# Patient Record
Sex: FEMALE | Race: WHITE | Employment: OTHER | URBAN - METROPOLITAN AREA
[De-identification: names, ages, dates, MRNs, and addresses within clinical notes are randomized per-mention and may not be internally consistent; named-entity substitution may affect disease eponyms.]

---

## 2019-11-15 PROBLEM — Z85.3 HISTORY OF BREAST CANCER: Status: ACTIVE | Noted: 2019-11-15

## 2021-01-29 ENCOUNTER — IMMUNIZATION (OUTPATIENT)
Dept: PRIMARY CARE CLINIC | Age: 81
End: 2021-01-29
Payer: MEDICARE

## 2021-01-29 PROCEDURE — 0001A COVID-19, PFIZER VACCINE 30MCG/0.3ML DOSE: CPT | Performed by: NURSE PRACTITIONER

## 2021-01-29 PROCEDURE — 91300 COVID-19, PFIZER VACCINE 30MCG/0.3ML DOSE: CPT | Performed by: NURSE PRACTITIONER

## 2021-02-19 ENCOUNTER — IMMUNIZATION (OUTPATIENT)
Dept: PRIMARY CARE CLINIC | Age: 81
End: 2021-02-19
Payer: MEDICARE

## 2021-02-19 PROCEDURE — 91300 COVID-19, PFIZER VACCINE 30MCG/0.3ML DOSE: CPT | Performed by: PHYSICIAN ASSISTANT

## 2021-02-19 PROCEDURE — 0002A COVID-19, PFIZER VACCINE 30MCG/0.3ML DOSE: CPT | Performed by: PHYSICIAN ASSISTANT

## 2024-04-09 ENCOUNTER — HOSPITAL ENCOUNTER (OUTPATIENT)
Dept: GASTROENTEROLOGY | Facility: HOSPITAL | Age: 84
Setting detail: OUTPATIENT SURGERY
Discharge: HOME | End: 2024-04-09
Payer: MEDICARE

## 2024-04-09 VITALS
OXYGEN SATURATION: 99 % | SYSTOLIC BLOOD PRESSURE: 188 MMHG | RESPIRATION RATE: 18 BRPM | DIASTOLIC BLOOD PRESSURE: 106 MMHG | HEART RATE: 64 BPM

## 2024-04-09 DIAGNOSIS — R13.10 DYSPHAGIA, UNSPECIFIED TYPE: ICD-10-CM

## 2024-04-09 PROCEDURE — 91010 ESOPHAGUS MOTILITY STUDY: CPT

## 2024-04-09 PROCEDURE — 91010 ESOPHAGUS MOTILITY STUDY: CPT | Performed by: INTERNAL MEDICINE

## 2024-04-09 RX ORDER — SUCRALFATE 1 G/1
1 TABLET ORAL
COMMUNITY

## 2024-04-09 RX ORDER — LIDOCAINE HYDROCHLORIDE 20 MG/ML
1 JELLY TOPICAL ONCE
Status: CANCELLED | OUTPATIENT
Start: 2024-04-09

## 2024-04-09 RX ORDER — DOXYCYCLINE 50 MG/1
50 TABLET ORAL DAILY
COMMUNITY
End: 2024-05-22 | Stop reason: ALTCHOICE

## 2024-04-09 RX ORDER — VENLAFAXINE 37.5 MG/1
37.5 TABLET ORAL 2 TIMES DAILY
COMMUNITY

## 2024-04-09 RX ORDER — LOPERAMIDE HCL 2 MG
1 TABLET ORAL 4 TIMES DAILY PRN
COMMUNITY

## 2024-04-09 RX ORDER — CELECOXIB 100 MG/1
100 CAPSULE ORAL 2 TIMES DAILY
COMMUNITY

## 2024-04-09 RX ORDER — PANTOPRAZOLE SODIUM 40 MG/1
40 TABLET, DELAYED RELEASE ORAL 2 TIMES DAILY
COMMUNITY

## 2024-04-09 RX ORDER — GABAPENTIN 300 MG/1
300 CAPSULE ORAL 3 TIMES DAILY
COMMUNITY

## 2024-04-12 PROCEDURE — 91037 ESOPH IMPED FUNCTION TEST: CPT | Performed by: INTERNAL MEDICINE

## 2024-05-09 ENCOUNTER — OFFICE VISIT (OUTPATIENT)
Dept: SURGERY | Facility: CLINIC | Age: 84
End: 2024-05-09
Payer: MEDICARE

## 2024-05-09 VITALS
TEMPERATURE: 97.9 F | BODY MASS INDEX: 19.65 KG/M2 | SYSTOLIC BLOOD PRESSURE: 192 MMHG | WEIGHT: 110.9 LBS | OXYGEN SATURATION: 96 % | HEIGHT: 63 IN | DIASTOLIC BLOOD PRESSURE: 69 MMHG | HEART RATE: 61 BPM

## 2024-05-09 DIAGNOSIS — K22.0 ACHALASIA OF ESOPHAGUS: Primary | ICD-10-CM

## 2024-05-09 PROCEDURE — 99205 OFFICE O/P NEW HI 60 MIN: CPT | Performed by: SURGERY

## 2024-05-09 PROCEDURE — 1036F TOBACCO NON-USER: CPT | Performed by: SURGERY

## 2024-05-09 PROCEDURE — 1159F MED LIST DOCD IN RCRD: CPT | Performed by: SURGERY

## 2024-05-09 PROCEDURE — 1126F AMNT PAIN NOTED NONE PRSNT: CPT | Performed by: SURGERY

## 2024-05-09 ASSESSMENT — ENCOUNTER SYMPTOMS
LOSS OF SENSATION IN FEET: 0
OCCASIONAL FEELINGS OF UNSTEADINESS: 0
DEPRESSION: 0

## 2024-05-09 ASSESSMENT — PAIN SCALES - GENERAL: PAINLEVEL: 0-NO PAIN

## 2024-05-09 NOTE — PROGRESS NOTES
General Surgery Clinic Note  Subjective   Chief Complaint/Reason for Visit: Eval for POEM; type 2 achalasia     HPI:  Bruce Ziegler is a 83 y.o. female with history of L breast DCIS, IBS, RA, osteoporosis  who presents to clinic today for evaluation for POEM for type 2 achalasia demonstrated on manometry on 4/9/24. She had an EGD 3/18/24 which demonstrated Schatzki's ring at GE junction. She has had > 5 EGD dilations in the past however none have provided lasting relief. She reports routinely taking BID PPI without much relief. Has to eat small meals to prevent regurgitation, otherwise regurgitates solid food. Denies F/C/N/abdominal pain.     A 12-point ROS was performed and was unremarkable except as above.    PMH:  Past Medical History:   Diagnosis Date    Allergy status to unspecified drugs, medicaments and biological substances     History of seasonal allergies    Anxiety disorder, unspecified     Anxiety    Dry eye syndrome of unspecified lacrimal gland     Dry eye    Other allergy status, other than to drugs and biological substances     History of environmental allergies    Personal history of malignant neoplasm of breast     History of breast cancer    Personal history of other diseases of the digestive system     History of IBS    Personal history of other diseases of the musculoskeletal system and connective tissue     History of arthritis     PSH:  Past Surgical History:   Procedure Laterality Date    GALLBLADDER SURGERY  09/21/2017    Gallbladder Surgery    TONSILLECTOMY  09/21/2017    Tonsillectomy     Soc Hx:  Social History     Socioeconomic History    Marital status:      Spouse name: Not on file    Number of children: Not on file    Years of education: Not on file    Highest education level: Not on file   Occupational History    Not on file   Tobacco Use    Smoking status: Never    Smokeless tobacco: Never   Substance and Sexual Activity    Alcohol use: Never    Drug use: Never    Sexual  activity: Not on file   Other Topics Concern    Not on file   Social History Narrative    Not on file     Social Determinants of Health     Financial Resource Strain: Not on file   Food Insecurity: Not on file   Transportation Needs: Not on file   Physical Activity: Not on file   Stress: Not on file   Social Connections: Not on file   Intimate Partner Violence: Not on file   Housing Stability: Not on file     Fam Hx:  No family history on file.   Allergies:  No Known Allergies  Current Medications:  Current Outpatient Medications on File Prior to Visit   Medication Sig Dispense Refill    calcium polycarbophiL (Fibercon) 625 mg tablet Take 1 tablet (625 mg) by mouth 2 times a day.      celecoxib (CeleBREX) 100 mg capsule Take 1 capsule (100 mg) by mouth 2 times a day.      gabapentin (Neurontin) 300 mg capsule Take 1 capsule (300 mg) by mouth 3 times a day.      loperamide (Imodium A-D) 2 mg tablet Take 0.5 tablets (1 mg) by mouth 4 times a day as needed for diarrhea (once a day).      pantoprazole (ProtoNix) 40 mg EC tablet Take 1 tablet (40 mg) by mouth 2 times a day. Do not crush, chew, or split.      sucralfate (Carafate) 1 gram tablet Take 1 tablet (1 g) by mouth 2 times a day before meals.      venlafaxine (Effexor) 37.5 mg tablet Take 1 tablet (37.5 mg) by mouth 2 times a day.      doxycycline (Adoxa) 50 mg tablet Take 1 tablet (50 mg) by mouth once daily. Take with a full glass of water and do not lie down for at least 30 minutes after.       No current facility-administered medications on file prior to visit.         Objective   Vitals:  Temp:  [36.6 °C (97.9 °F)] 36.6 °C (97.9 °F)  Heart Rate:  [61] 61  BP: (192)/(69) 192/69    Physical Exam:  GEN: No acute distress. Alert, awake and conversive.  HEENT: Sclera anicteric. Moist mucous membranes.  RESP: Breathing non-labored, equal chest rise. On RA.  CV: Regular rate, normotensive  GI: Abdomen soft, nondistended, nontender.   : Deferred.  MSK: No gross  deformities. Moves all extremities spontaneously.  NEURO: Alert and oriented x3. No focal deficits.  PSYCH: Appropriate mood and affect.  SKIN: No rashes or lesions.    Labs within past 24h:  No results found for this or any previous visit (from the past 24 hour(s)).    Imaging within past 24h:  Esophageal manometry 4/9/24 consistent with type 2 achalasia     No pertinent imaging to review.     ASSESSMENT  Bruce Ziegler is a 83 y.o. female with history of L breast DCIS, IBS, RA, osteoporosis who presents to clinic today for evaluation for POEM for type 2 achalasia demonstrated on manometry.     PLAN:  - Patient is interested in proceeding with POEM  - Discussed details of surgery and associated risks and benefits.  Risks, benefits, alternatives, complications of POEM were explained at length including bleeding, infection, leakage, pneumoperitoneum, nonresolution or recurrence in symptoms, and possible need for subsequent endoscopic or surgical intervention.    Patient's exam, labs, and findings discussed and seen with Dr. Chandler, who agrees with the plan as described above.    Radha Alaniz MD  PGY-1 General Surgery

## 2024-05-14 DIAGNOSIS — K22.0 ACHALASIA: ICD-10-CM

## 2024-05-14 RX ORDER — NYSTATIN 100000 [USP'U]/ML
5 SUSPENSION ORAL 4 TIMES DAILY
Qty: 60 ML | Refills: 0 | Status: SHIPPED | OUTPATIENT
Start: 2024-05-14 | End: 2024-05-17

## 2024-05-14 RX ORDER — FLUCONAZOLE 200 MG/1
200 TABLET ORAL ONCE
Qty: 1 TABLET | Refills: 0 | Status: SHIPPED | OUTPATIENT
Start: 2024-05-14 | End: 2024-05-14

## 2024-05-20 ENCOUNTER — CLINICAL SUPPORT (OUTPATIENT)
Dept: PREADMISSION TESTING | Facility: HOSPITAL | Age: 84
End: 2024-05-20
Payer: MEDICARE

## 2024-05-20 NOTE — CPM/PAT NURSE NOTE
CPM/PAT Nurse Note      Name: Bruce Ziegler (Bruce Ziegler)  /Age: 1940/83 y.o.       Past Medical History:   Diagnosis Date    Anxiety     Breast CA (Multi)     s/p lumpectomy and radiation    Depression     mild    Dry eye syndrome of unspecified lacrimal gland     Dry eye    GERD (gastroesophageal reflux disease)     Irritable bowel syndrome     Osteoporosis     RA (rheumatoid arthritis) (Multi)     Schatzki's ring        Past Surgical History:   Procedure Laterality Date    CHOLECYSTECTOMY      ESOPHAGOGASTRODUODENOSCOPY  2024    OTHER SURGICAL HISTORY      > 5 EGD dilations    TONSILLECTOMY  2017    Tonsillectomy       Patient  has no history on file for sexual activity.    No family history on file.    No Known Allergies    Prior to Admission medications    Medication Sig Start Date End Date Taking? Authorizing Provider   calcium polycarbophiL (Fibercon) 625 mg tablet Take 1 tablet (625 mg) by mouth 2 times a day.    Historical Provider, MD   celecoxib (CeleBREX) 100 mg capsule Take 1 capsule (100 mg) by mouth 2 times a day.    Historical Provider, MD   doxycycline (Adoxa) 50 mg tablet Take 1 tablet (50 mg) by mouth once daily. Take with a full glass of water and do not lie down for at least 30 minutes after.    Historical Provider, MD   fluconazole (Diflucan) 200 mg tablet Take 1 tablet (200 mg) by mouth 1 time for 1 dose. 24  Travon Chandler MD   gabapentin (Neurontin) 300 mg capsule Take 1 capsule (300 mg) by mouth 3 times a day.    Historical Provider, MD   loperamide (Imodium A-D) 2 mg tablet Take 0.5 tablets (1 mg) by mouth 4 times a day as needed for diarrhea (once a day).    Historical Provider, MD   nystatin (Mycostatin) 100,000 unit/mL suspension Take 5 mL (500,000 Units) by mouth 4 times a day for 3 days. 24  Travon Chandler MD   pantoprazole (ProtoNix) 40 mg EC tablet Take 1 tablet (40 mg) by mouth 2 times a day. Do not crush, chew, or split.     Historical Provider, MD   sucralfate (Carafate) 1 gram tablet Take 1 tablet (1 g) by mouth 2 times a day before meals.    Historical Provider, MD   venlafaxine (Effexor) 37.5 mg tablet Take 1 tablet (37.5 mg) by mouth 2 times a day.    Historical Provider, MD PATSY JEWELL     DASI Risk Score    No data to display       Caprini DVT Assessment    No data to display       Modified Frailty Index    No data to display       CHADS2 Stroke Risk  Current as of today        N/A 3 to 100%: High Risk   2 to < 3%: Medium Risk   0 to < 2%: Low Risk     Last Change: N/A          This score determines the patient's risk of having a stroke if the patient has atrial fibrillation.        This score is not applicable to this patient. Components are not calculated.          Revised Cardiac Risk Index    No data to display       Apfel Simplified Score    No data to display       Risk Analysis Index Results This Encounter    No data found in the last 1 encounters.     Scheduled for esophagoscopy with submucosal myotomy on 24 with Dr. Chandler.  PCP: Elijah Ritchie MD   Phone: [phone], Fax: [phone]  -[id number] Sinus Rhythm    General surgery: Travon Chandler MD LOV 24    Rheumatology: Milton Alford DO seen for RA  Phone: [phone], Fax: [phone]     Gastroenterology: Danny Pan MD seen for IBS  Phone: [phone], Fax: [phone]    Nurse Plan of Action:   Requested last office note  Update: 24 Received last office note from gastroenterology    Zara Michael RN  Pre Admission Testing

## 2024-05-22 ENCOUNTER — PRE-ADMISSION TESTING (OUTPATIENT)
Dept: PREADMISSION TESTING | Facility: HOSPITAL | Age: 84
End: 2024-05-22
Payer: MEDICARE

## 2024-05-22 VITALS
OXYGEN SATURATION: 96 % | DIASTOLIC BLOOD PRESSURE: 74 MMHG | HEIGHT: 63 IN | TEMPERATURE: 98.1 F | WEIGHT: 108 LBS | SYSTOLIC BLOOD PRESSURE: 150 MMHG | HEART RATE: 61 BPM | BODY MASS INDEX: 19.14 KG/M2

## 2024-05-22 DIAGNOSIS — Z01.818 PREOP EXAMINATION: Primary | ICD-10-CM

## 2024-05-22 DIAGNOSIS — M06.9 RHEUMATOID ARTHRITIS, INVOLVING UNSPECIFIED SITE, UNSPECIFIED WHETHER RHEUMATOID FACTOR PRESENT (MULTI): ICD-10-CM

## 2024-05-22 DIAGNOSIS — K22.89 OTHER SPECIFIED DISEASE OF ESOPHAGUS: ICD-10-CM

## 2024-05-22 DIAGNOSIS — K22.0 ACHALASIA: ICD-10-CM

## 2024-05-22 LAB
ABO GROUP (TYPE) IN BLOOD: NORMAL
ANION GAP SERPL CALC-SCNC: 13 MMOL/L (ref 10–20)
ANTIBODY SCREEN: NORMAL
APTT PPP: 30 SECONDS (ref 27–38)
BUN SERPL-MCNC: 13 MG/DL (ref 6–23)
CALCIUM SERPL-MCNC: 8.9 MG/DL (ref 8.6–10.6)
CHLORIDE SERPL-SCNC: 104 MMOL/L (ref 98–107)
CO2 SERPL-SCNC: 32 MMOL/L (ref 21–32)
CREAT SERPL-MCNC: 0.71 MG/DL (ref 0.5–1.05)
EGFRCR SERPLBLD CKD-EPI 2021: 86 ML/MIN/1.73M*2
ERYTHROCYTE [DISTWIDTH] IN BLOOD BY AUTOMATED COUNT: 12.8 % (ref 11.5–14.5)
GLUCOSE SERPL-MCNC: 77 MG/DL (ref 74–99)
HCT VFR BLD AUTO: 40 % (ref 36–46)
HGB BLD-MCNC: 13.2 G/DL (ref 12–16)
INR PPP: 1 (ref 0.9–1.1)
MCH RBC QN AUTO: 29.9 PG (ref 26–34)
MCHC RBC AUTO-ENTMCNC: 33 G/DL (ref 32–36)
MCV RBC AUTO: 91 FL (ref 80–100)
NRBC BLD-RTO: 0 /100 WBCS (ref 0–0)
PLATELET # BLD AUTO: 146 X10*3/UL (ref 150–450)
POTASSIUM SERPL-SCNC: 3.8 MMOL/L (ref 3.5–5.3)
PROTHROMBIN TIME: 10.7 SECONDS (ref 9.8–12.8)
RBC # BLD AUTO: 4.42 X10*6/UL (ref 4–5.2)
RH FACTOR (ANTIGEN D): NORMAL
SODIUM SERPL-SCNC: 145 MMOL/L (ref 136–145)
WBC # BLD AUTO: 4.2 X10*3/UL (ref 4.4–11.3)

## 2024-05-22 PROCEDURE — 86901 BLOOD TYPING SEROLOGIC RH(D): CPT

## 2024-05-22 PROCEDURE — 82374 ASSAY BLOOD CARBON DIOXIDE: CPT

## 2024-05-22 PROCEDURE — 85610 PROTHROMBIN TIME: CPT

## 2024-05-22 PROCEDURE — 85027 COMPLETE CBC AUTOMATED: CPT

## 2024-05-22 PROCEDURE — 99204 OFFICE O/P NEW MOD 45 MIN: CPT | Performed by: NURSE PRACTITIONER

## 2024-05-22 PROCEDURE — 36415 COLL VENOUS BLD VENIPUNCTURE: CPT

## 2024-05-22 ASSESSMENT — DUKE ACTIVITY SCORE INDEX (DASI)
CAN YOU DO YARD WORK LIKE RAKING LEAVES, WEEDING OR PUSHING A MOWER: YES
CAN YOU WALK A BLOCK OR TWO ON LEVEL GROUND: YES
CAN YOU RUN A SHORT DISTANCE: NO
DASI METS SCORE: 8
CAN YOU WALK INDOORS, SUCH AS AROUND YOUR HOUSE: YES
CAN YOU DO HEAVY WORK AROUND THE HOUSE LIKE SCRUBBING FLOORS OR LIFTING AND MOVING HEAVY FURNITURE: YES
TOTAL_SCORE: 42.7
CAN YOU PARTICIPATE IN MODERATE RECREATIONAL ACTIVITIES LIKE GOLF, BOWLING, DANCING, DOUBLES TENNIS OR THROWING A BASEBALL OR FOOTBALL: YES
CAN YOU TAKE CARE OF YOURSELF (EAT, DRESS, BATHE, OR USE TOILET): YES
CAN YOU DO MODERATE WORK AROUND THE HOUSE LIKE VACUUMING, SWEEPING FLOORS OR CARRYING GROCERIES: YES
CAN YOU HAVE SEXUAL RELATIONS: YES
CAN YOU DO LIGHT WORK AROUND THE HOUSE LIKE DUSTING OR WASHING DISHES: YES
CAN YOU CLIMB A FLIGHT OF STAIRS OR WALK UP A HILL: YES
CAN YOU PARTICIPATE IN STRENOUS SPORTS LIKE SWIMMING, SINGLES TENNIS, FOOTBALL, BASKETBALL, OR SKIING: NO

## 2024-05-22 ASSESSMENT — ENCOUNTER SYMPTOMS
ENDOCRINE NEGATIVE: 1
CONSTITUTIONAL NEGATIVE: 1
CHILLS: 0
FEVER: 0
CARDIOVASCULAR NEGATIVE: 1
EYES NEGATIVE: 1
NECK NEGATIVE: 1
GASTROINTESTINAL NEGATIVE: 1
TROUBLE SWALLOWING: 1
NEUROLOGICAL NEGATIVE: 1
MUSCULOSKELETAL NEGATIVE: 1
RESPIRATORY NEGATIVE: 1

## 2024-05-22 ASSESSMENT — PAIN SCALES - GENERAL: PAINLEVEL_OUTOF10: 0 - NO PAIN

## 2024-05-22 ASSESSMENT — LIFESTYLE VARIABLES: SMOKING_STATUS: NONSMOKER

## 2024-05-22 ASSESSMENT — PAIN - FUNCTIONAL ASSESSMENT: PAIN_FUNCTIONAL_ASSESSMENT: 0-10

## 2024-05-22 NOTE — CPM/PAT H&P
CPM/PAT Evaluation       Name: Bruce Ziegler (Bruce Ziegler)  /Age: 1940/83 y.o.     Visit Type:   In-Person       Chief Complaint: Esophagoscopy with Submucosal Myotomy     HPI Patient is an 83 year old female, accompanied by significant other, scheduled for surgery with Dr. Zhen Chandler on 24 related to Achalasia of cardia     Patient referred by Dr. Chandler for preoperative evaluation of anxiety, depression, GERD, dysphagia, IBS, osteoporosis and Rheumatoid arthritis.     Past Medical History:   Diagnosis Date    Anxiety     Breast CA (Multi)     s/p lumpectomy and radiation    Depression     mild    Dry eye syndrome of unspecified lacrimal gland     Dry eye    Dysphagia     GERD (gastroesophageal reflux disease)     Hiatal hernia     Irritable bowel syndrome     Osteoporosis     Schatzki's ring      Past Surgical History:   Procedure Laterality Date    CHOLECYSTECTOMY      COLONOSCOPY  2023    ESOPHAGOGASTRODUODENOSCOPY  2024    several EGDs    OTHER SURGICAL HISTORY      > 5 EGD dilations    TONSILLECTOMY  2017    Tonsillectomy     Family History   Adopted: Yes   Problem Relation Name Age of Onset    Cancer Other       No Known Allergies    Prior to Admission medications    Medication Sig Start Date End Date Taking? Authorizing Provider   calcium polycarbophiL (Fibercon) 625 mg tablet Take 1 tablet (625 mg) by mouth 2 times a day.    Historical Provider, MD   celecoxib (CeleBREX) 100 mg capsule Take 1 capsule (100 mg) by mouth 2 times a day.    Historical Provider, MD   doxycycline (Adoxa) 50 mg tablet Take 1 tablet (50 mg) by mouth once daily. Take with a full glass of water and do not lie down for at least 30 minutes after.    Historical Provider, MD   gabapentin (Neurontin) 300 mg capsule Take 1 capsule (300 mg) by mouth 3 times a day.    Historical Provider, MD   loperamide (Imodium A-D) 2 mg tablet Take 0.5 tablets (1 mg) by mouth 4 times a day as needed for diarrhea  (once a day).    Historical Provider, MD   nystatin (Mycostatin) 100,000 unit/mL suspension Take 5 mL (500,000 Units) by mouth 4 times a day for 3 days. 5/14/24 5/17/24  Travon Chandler MD   pantoprazole (ProtoNix) 40 mg EC tablet Take 1 tablet (40 mg) by mouth 2 times a day. Do not crush, chew, or split.    Historical Provider, MD   sucralfate (Carafate) 1 gram tablet Take 1 tablet (1 g) by mouth 2 times a day before meals.    Historical Provider, MD   venlafaxine (Effexor) 37.5 mg tablet Take 1 tablet (37.5 mg) by mouth 2 times a day.    Historical Provider, MD GARNER ROS:   Constitutional:   neg     no fever   no chills  Neuro/Psych:   neg    Eyes:   neg     use of corrective lenses  Ears:   Nose:   neg    Mouth:   neg    Throat:   neg     dysphagia  Neck:   neg    Cardio:   neg    Respiratory:   neg    Endocrine:   neg    GI:   neg    :   neg    Musculoskeletal:   neg    Hematologic:   neg     no history of blood transfusion   no blood clots  Skin:  neg      Physical Exam  Vitals reviewed.   Constitutional:       Appearance: Normal appearance. She is normal weight.   HENT:      Head: Normocephalic and atraumatic.      Nose: Nose normal.      Mouth/Throat:      Mouth: Mucous membranes are moist.      Pharynx: Oropharynx is clear.   Eyes:      Extraocular Movements: Extraocular movements intact.      Pupils: Pupils are equal, round, and reactive to light.   Cardiovascular:      Rate and Rhythm: Normal rate and regular rhythm.      Heart sounds: Normal heart sounds.   Pulmonary:      Effort: Pulmonary effort is normal.      Breath sounds: Normal breath sounds.   Abdominal:      General: Abdomen is flat. Bowel sounds are normal.      Palpations: Abdomen is soft.   Musculoskeletal:         General: Normal range of motion.      Cervical back: Normal range of motion and neck supple.   Skin:     General: Skin is warm and dry.   Neurological:      Mental Status: She is alert and oriented to person, place, and time.  "  Psychiatric:         Mood and Affect: Mood normal.         Behavior: Behavior normal.         Thought Content: Thought content normal.         Judgment: Judgment normal.        PAT AIRWAY:   Airway:     Mallampati::  III    Neck ROM::  Full   Upper and lower partials   partials    Visit Vitals  /74 Comment: manual recheck   Pulse 61   Temp 36.7 °C (98.1 °F) (Temporal)     Vitals:    05/22/24 1313 05/22/24 1335   BP: 159/65 150/74  Comment: manual recheck   Pulse: 61    Temp: 36.7 °C (98.1 °F)    TempSrc: Temporal    SpO2: 96%    Weight: 49 kg (108 lb)    Height: 1.6 m (5' 3\")       DASI Risk Score      Flowsheet Row Most Recent Value   DASI SCORE 42.7   METS Score (Will be calculated only when all the questions are answered) 8          Caprini DVT Assessment      Flowsheet Row Most Recent Value   DVT Score 10   Current Status Major surgery planned, lasting 2-3 hours   History Prior major surgery, Previous malignancy   Age Over 75 years   BMI 30 or less          Modified Frailty Index      Flowsheet Row Most Recent Value   Modified Frailty Index Calculator 0          CHADS2 Stroke Risk  Current as of 9 minutes ago        N/A 3 to 100%: High Risk   2 to < 3%: Medium Risk   0 to < 2%: Low Risk     Last Change: N/A          This score determines the patient's risk of having a stroke if the patient has atrial fibrillation.        This score is not applicable to this patient. Components are not calculated.          Revised Cardiac Risk Index      Flowsheet Row Most Recent Value   Revised Cardiac Risk Calculator 1          Apfel Simplified Score      Flowsheet Row Most Recent Value   Apfel Simplified Score Calculator 3          Risk Analysis Index Results This Encounter    No data found in the last 1 encounters.       Stop Bang Score      Flowsheet Row Most Recent Value   Do you snore loudly? 0   Do you often feel tired or fatigued after your sleep? 0   Has anyone ever observed you stop breathing in your sleep? 0 "   Do you have or are you being treated for high blood pressure? 0   Recent BMI (Calculated) 19.1   Is BMI greater than 35 kg/m2? 0=No   Age older than 50 years old? 1=Yes   Is your neck circumference greater than 17 inches (Male) or 16 inches (Female)? 0   Gender - Male 0=No   STOP-BANG Total Score 1          Assessment and Plan:     Neuro:   No neurologic diagnoses, however, the patient is at an increased risk for post operative delirium secondary to age >/= 65, depression, type and duration of surgery    The patient is at an increased risk for perioperative stroke secondary to increased age, female, general anesthesia, and op time >2.5 hours    Patient given information on brain exercises and delirium prevention.    Anxiety & depression  States anxiety and depression are currently controlled with venlafaxine    HEENT/Airway  No diagnosis or significant findings on chart review or clinical presentation and evaluation.    Cardiovascular  No diagnosis or significant findings on chart review or clinical presentation and evaluation.    RCRI  The patient meets 0-1 RCRI criteria and therefore has a less than 1% risk of major adverse cardiac complications.  METS  The patient's functional capacity is greater than 4 METS.  MACE  30-day risk for MACE of 1 predictor, 6.0% risk for cardiac death, nonfatal myocardial infarction, and nonfatal cardiac arrest  SHARI  0.4% risk for 51st to 90th percentile    Pulmonary   No diagnosis or significant findings on chart review or clinical presentation and evaluation.    STOP BANG Score of 1, which places patient at low risk for having SHERON.  ARISCAT 32, Intermediate, 13.3% risk of in-hospital postoperative pulmonary complications  PRODIGY 19, high of respiratory depression episode.    Patient given information on deep breathing exercises.     Renal  No diagnosis or significant findings on chart review or clinical presentation and evaluation.    Endocrine  No diagnosis or significant  findings on chart review or clinical presentation and evaluation.    Hematology  No diagnosis or significant findings on chart review or clinical presentation and evaluation.    Caprini score 10, high risk of VTE    Transfusion Evaluation  A type and screen was obtained given the likelihood for perioperative transfusion of blood or blood products.    Patient given information on DVT prevention.     GI    GERD & dysphagia  Currently treated with pantoprazole and sucralfate. Has also been given Nystatin previously.     IBS  States is currently controlled with Fibercon, though she does need loperamide at times    Scheduled for surgery. Outside office information found in Media tab.     Achalasia of cardia   States she has been doing better by chewing food better, but still has issues with food sticking and swallowing.    Scheduled for surgery    Eat 10- 3  Apfel: 3 points 61% risk for post operative nausea/vomiting.     Genitourinary  No diagnosis or significant findings on chart review or clinical presentation and evaluation.    ID  No diagnosis or significant findings on chart review or clinical presentation and evaluation.    Musculoskeletal    Osteoporosis   Currently treated with celecoxib and gabapentin.     Rheumatoid arthritis  States has been told she has Rheumatoid arthritis.     -Preoperative medication instructions were provided and reviewed with the patient.  Any additional testing or evaluation was explained to the patient.  NPO Instructions were discussed, and the patient's questions were answered prior to conclusion of this encounter    Labs ordered:    5-22-24: Platelets are 146. Dr. Chandler notified    Recent Results (from the past 168 hour(s))   CBC    Collection Time: 05/22/24  1:24 PM   Result Value Ref Range    WBC 4.2 (L) 4.4 - 11.3 x10*3/uL    nRBC 0.0 0.0 - 0.0 /100 WBCs    RBC 4.42 4.00 - 5.20 x10*6/uL    Hemoglobin 13.2 12.0 - 16.0 g/dL    Hematocrit 40.0 36.0 - 46.0 %    MCV 91 80 - 100 fL     MCH 29.9 26.0 - 34.0 pg    MCHC 33.0 32.0 - 36.0 g/dL    RDW 12.8 11.5 - 14.5 %    Platelets 146 (L) 150 - 450 x10*3/uL   Basic Metabolic Panel    Collection Time: 05/22/24  1:24 PM   Result Value Ref Range    Glucose 77 74 - 99 mg/dL    Sodium 145 136 - 145 mmol/L    Potassium 3.8 3.5 - 5.3 mmol/L    Chloride 104 98 - 107 mmol/L    Bicarbonate 32 21 - 32 mmol/L    Anion Gap 13 10 - 20 mmol/L    Urea Nitrogen 13 6 - 23 mg/dL    Creatinine 0.71 0.50 - 1.05 mg/dL    eGFR 86 >60 mL/min/1.73m*2    Calcium 8.9 8.6 - 10.6 mg/dL   Coagulation Screen    Collection Time: 05/22/24  1:24 PM   Result Value Ref Range    Protime 10.7 9.8 - 12.8 seconds    INR 1.0 0.9 - 1.1    aPTT 30 27 - 38 seconds   Type And Screen    Collection Time: 05/22/24  1:24 PM   Result Value Ref Range    ABO TYPE A     Rh TYPE NEG     ANTIBODY SCREEN NEG

## 2024-05-22 NOTE — PREPROCEDURE INSTRUCTIONS
Thank you for visiting [facility] ([facility]) today for your pre-procedure evaluation, you were seen by    Monique Ruvalcaba, MSN, NP-C, CNP  Family Nurse Practitioner  Department of Anesthesiology and Perioperative Medicine  Main phone: [phone]  Fax :[phone]    **Please be sure to follow any guidance provided by your surgeon regarding foods, fluids and medications prior to surgery**      This summary includes instructions and information to aid you during your perioperative period.  Please read carefully. If you have any questions about your visit today, please call the number listed above.  If you become ill or have any changes to your health before your surgery, please contact your primary care provider and alert your surgeon.    Preparing for your Surgery       Exercises  Preoperative Deep Breathing Exercises  Why it is important to do deep breathing exercises before my surgery?  Deep breathing exercises strengthen your breathing muscles.  This helps you to recover after your surgery and decreases the chance of breathing complications.  How are the deep breathing exercises done?  Sit straight with your back supported.  Breathe in deeply and slowly through your nose. Your lower rib cage should expand and your abdomen may move forward.  Hold that breath for 3 to 5 seconds.  Breathe out through pursed lips, slowly and completely.  Rest and repeat 10 times every hour while awake.  Rest longer if you become dizzy or lightheaded.    Preoperative Brain Exercises    What are brain exercises?  A brain exercise is any activity that engages your thinking (cognitive) skills.    What types of activities are considered brain exercises?  Jigsaw puzzles, crossword puzzles, word jumble, memory games, word search, and many more.  Many can be found free online or on your phone via a mobile stephanie.    Why should I do brain exercises before my surgery?  More recent research has shown brain exercise before  surgery can lower the risk of postoperative delirium (confusion) which can be especially important for older adults.  Patients who did brain exercises for 5 to 10 hours the days before surgery, cut their risk of postoperative delirium in half up to 1 week after surgery.    Sit-to-Stand Exercise    What is the sit-to-stand exercise?  The sit-to-stand exercise strengthens the muscles of your lower body and muscles in the center of your body (core muscles for stability) helping to maintain and improve your strength and mobility.  How do I do the sit-to-stand exercise?  The goal is to do this exercise without using your arms or hands.  If this is too difficult, use your arms and hands or a chair with armrests to help slowly push yourself to the standing position and lower yourself back to the sitting position. As the movement becomes easier use your arms and hands less.    Steps to the sit-to-stand exercise  Sit up tall in a sturdy chair, knees bent, feet flat on the floor shoulder-width apart.  Shift your hips/pelvis forward in the chair to correctly position yourself for the next movement.  Lean forward at your hips.  Stand up straight putting equal weight on both feet.  Check to be sure you are properly aligned with the chair, in a slow controlled movement sit back down.  Repeat this exercise 10-15 times.  If needed you can do it fewer times until your strength improves.  Rest for 1 minute.  Do another 10-15 sit-to-stand exercises.  Try to do this in the morning and evening.        Instructions    Preoperative Fasting Guidelines    Why must I stop eating and drinking near surgery time?  With sedation, food or liquid in your stomach can enter your lungs causing serious complications  Food can increase nausea and vomiting  When do I need to stop eating and drinking before my surgery?      Do not eat any food after midnight the night before your surgery/procedure. You may have up to 13.5 ounces of clear liquid until TWO  hours before your instructed arrival time to the hospital.  This includes water, black tea/coffee, (no milk or cream) apple juice, and electrolyte drinks (Gatorade). You may chew gum until TWO hours before your surgery/procedure            Simple things you can do to help prevent blood clots     Blood clots are blockages that can form in the body's veins. When a blood clot forms in your deep veins, it may be called a deep vein thrombosis, or DVT for short. Blood clots can happen in any part of the body where blood flows, but they are most common in the arms and legs. If a piece of a blood clot breaks free and travels to the lungs, it is called a pulmonary embolus (PE). A PE can be a very serious problem.         Being in the hospital or having surgery can raise your chances of getting a blood clot because you may not be well enough to move around as much as you normally do.         Ways you can help prevent blood clots in the hospital       Wearing SCDs  SCDs stands for Sequential Compression Devices.   SCDs are special sleeves that wrap around your legs. They attach to a pump that fills them with air to gently squeeze your legs every few minutes.  This helps return the blood in your legs to your heart.   SCDs should only be taken off when walking or bathing. SCDs may not be comfortable, but they can help save your life.              Pump SCD leg sleeves  Wearing compression stockings - if your doctor orders them. These special snug-fitting stockings gently squeeze your legs to help blood flow.       Walking. Walking helps move the blood in your legs.   If your doctor says it is ok, try walking the halls at least   5 times a day. Ask us to help you get up, so you don't fall.      Taking any blood-thinning medicines your doctor orders.              Ways you can help prevent blood clots at home         Wearing compression stockings - if your doctor orders them.   Walking - to help move the blood in your legs.     Taking any blood-thinning medicines your doctor orders.      Signs of a blood clot or PE    Tell your doctor or nurse right away if you have any of the problems listed below.         If you are at home, seek medical care right away. Call 911 for chest pain or problems breathing.            Signs of a blood clot (DVT) - such as pain, swelling, redness, or warmth in your arm or legs.  Signs of a pulmonary embolism (PE) - such as chest pain or feeling short of breath      Tobacco and Alcohol;  Do not drink alcohol or smoke within 24 hours of surgery.  It is best to quit smoking for as long as possible before any surgery or procedure.      The Week before Surgery        Seven days before Surgery  Check your CPM medication instructions  Do the exercises provided to you by CPM   Arrange for a responsible, adult licensed  to take you home after surgery and stay with you for 24 hours.  You will not be permitted to drive yourself home if you have received any anesthetic/sedation  Six days before surgery  Check your C[facility] medication instructions  Do the exercises provided to you by C[facility]   Start using Chlorhexidene (CHG) body wash if prescribed  Five days before surgery  Check your C[facility] medication instructions  Do the exercises provided to you by CPM   Continue to use CHG body wash if prescribed  Three days before surgery  Check your CPM medication instructions  Do the exercises provided to you by CPM   Continue to use CHG body wash if prescribed  Two days before surgery  Check your CPM medication instructions  Do the exercises provided to you by CPM   Continue to use CHG body wash if prescribed    The Day before Surgery       Check your CPM medication and all other CPM instructions including when to stop eating and drinking  You will be called with your arrival time for surgery in the late afternoon.  If you do not receive a call please reach out to your surgeon's office.  Do not smoke or drink 24 hours before  surgery  Prepare items to bring with you to the hospital  Shower with your chlorhexidine wash if prescribed  Brush your teeth and use your chlorhexidine dental rinse if prescribed    The Day of Surgery       Check your CPM medication instructions  Ensure you follow the instructions for when to stop eating and drinking  Shower, if prescribed use CHG.  Do not apply any lotions, creams, moisturizers, perfume or deodorant  Brush your teeth and use your CHG dental rinse if prescribed  Wear loose comfortable clothing  Avoid make-up  Remove  jewelry and piercings, consider professional piercing removal with a plastic spacer if needed  Bring photo ID and Insurance card  Bring an accurate medication list that includes medication dose, frequency and allergies  Bring a copy of your advanced directives (will, health care power of )  Bring any devices and controllers as well as medical devices you have been provided with for surgery (CPAP, slings, braces, etc.)  Dentures, eyeglasses, and contacts will be removed before surgery, please bring cases for contacts or glasses

## 2024-05-30 ENCOUNTER — ANESTHESIA EVENT (OUTPATIENT)
Dept: OPERATING ROOM | Facility: HOSPITAL | Age: 84
DRG: 328 | End: 2024-05-30
Payer: MEDICARE

## 2024-05-31 ENCOUNTER — HOSPITAL ENCOUNTER (INPATIENT)
Facility: HOSPITAL | Age: 84
LOS: 1 days | Discharge: HOME | DRG: 328 | End: 2024-06-01
Attending: SURGERY | Admitting: SURGERY
Payer: MEDICARE

## 2024-05-31 ENCOUNTER — ANESTHESIA (OUTPATIENT)
Dept: OPERATING ROOM | Facility: HOSPITAL | Age: 84
DRG: 328 | End: 2024-05-31
Payer: MEDICARE

## 2024-05-31 DIAGNOSIS — K22.0 ACHALASIA: Primary | ICD-10-CM

## 2024-05-31 PROCEDURE — C1729 CATH, DRAINAGE: HCPCS | Performed by: SURGERY

## 2024-05-31 PROCEDURE — C9113 INJ PANTOPRAZOLE SODIUM, VIA: HCPCS | Performed by: STUDENT IN AN ORGANIZED HEALTH CARE EDUCATION/TRAINING PROGRAM

## 2024-05-31 PROCEDURE — 43497 TRANSORL LWR ESOPHGL MYOTOMY: CPT | Performed by: SURGERY

## 2024-05-31 PROCEDURE — 7100000002 HC RECOVERY ROOM TIME - EACH INCREMENTAL 1 MINUTE: Performed by: SURGERY

## 2024-05-31 PROCEDURE — 3700000001 HC GENERAL ANESTHESIA TIME - INITIAL BASE CHARGE: Performed by: SURGERY

## 2024-05-31 PROCEDURE — 1100000001 HC PRIVATE ROOM DAILY

## 2024-05-31 PROCEDURE — 2500000004 HC RX 250 GENERAL PHARMACY W/ HCPCS (ALT 636 FOR OP/ED): Performed by: STUDENT IN AN ORGANIZED HEALTH CARE EDUCATION/TRAINING PROGRAM

## 2024-05-31 PROCEDURE — 3600000009 HC OR TIME - EACH INCREMENTAL 1 MINUTE - PROCEDURE LEVEL FOUR: Performed by: SURGERY

## 2024-05-31 PROCEDURE — 2720000007 HC OR 272 NO HCPCS: Performed by: SURGERY

## 2024-05-31 PROCEDURE — 2500000005 HC RX 250 GENERAL PHARMACY W/O HCPCS

## 2024-05-31 PROCEDURE — 0D844ZZ DIVISION OF ESOPHAGOGASTRIC JUNCTION, PERCUTANEOUS ENDOSCOPIC APPROACH: ICD-10-PCS | Performed by: STUDENT IN AN ORGANIZED HEALTH CARE EDUCATION/TRAINING PROGRAM

## 2024-05-31 PROCEDURE — 7100000001 HC RECOVERY ROOM TIME - INITIAL BASE CHARGE: Performed by: SURGERY

## 2024-05-31 PROCEDURE — 2500000001 HC RX 250 WO HCPCS SELF ADMINISTERED DRUGS (ALT 637 FOR MEDICARE OP): Performed by: STUDENT IN AN ORGANIZED HEALTH CARE EDUCATION/TRAINING PROGRAM

## 2024-05-31 PROCEDURE — 3600000004 HC OR TIME - INITIAL BASE CHARGE - PROCEDURE LEVEL FOUR: Performed by: SURGERY

## 2024-05-31 PROCEDURE — 43497 TRANSORL LWR ESOPHGL MYOTOMY: CPT | Performed by: STUDENT IN AN ORGANIZED HEALTH CARE EDUCATION/TRAINING PROGRAM

## 2024-05-31 PROCEDURE — 2500000005 HC RX 250 GENERAL PHARMACY W/O HCPCS: Performed by: STUDENT IN AN ORGANIZED HEALTH CARE EDUCATION/TRAINING PROGRAM

## 2024-05-31 PROCEDURE — 3700000002 HC GENERAL ANESTHESIA TIME - EACH INCREMENTAL 1 MINUTE: Performed by: SURGERY

## 2024-05-31 PROCEDURE — 2500000004 HC RX 250 GENERAL PHARMACY W/ HCPCS (ALT 636 FOR OP/ED)

## 2024-05-31 RX ORDER — PANTOPRAZOLE SODIUM 40 MG/10ML
40 INJECTION, POWDER, LYOPHILIZED, FOR SOLUTION INTRAVENOUS
Status: DISCONTINUED | OUTPATIENT
Start: 2024-06-01 | End: 2024-06-01 | Stop reason: HOSPADM

## 2024-05-31 RX ORDER — HYDROMORPHONE HYDROCHLORIDE 1 MG/ML
0.1 INJECTION, SOLUTION INTRAMUSCULAR; INTRAVENOUS; SUBCUTANEOUS EVERY 5 MIN PRN
Status: DISCONTINUED | OUTPATIENT
Start: 2024-05-31 | End: 2024-05-31

## 2024-05-31 RX ORDER — PANTOPRAZOLE SODIUM 40 MG/10ML
40 INJECTION, POWDER, LYOPHILIZED, FOR SOLUTION INTRAVENOUS ONCE
Status: COMPLETED | OUTPATIENT
Start: 2024-05-31 | End: 2024-05-31

## 2024-05-31 RX ORDER — ROCURONIUM BROMIDE 10 MG/ML
INJECTION, SOLUTION INTRAVENOUS AS NEEDED
Status: DISCONTINUED | OUTPATIENT
Start: 2024-05-31 | End: 2024-05-31

## 2024-05-31 RX ORDER — MEPERIDINE HYDROCHLORIDE 25 MG/ML
12.5 INJECTION INTRAMUSCULAR; INTRAVENOUS; SUBCUTANEOUS EVERY 10 MIN PRN
Status: DISCONTINUED | OUTPATIENT
Start: 2024-05-31 | End: 2024-05-31

## 2024-05-31 RX ORDER — IBUPROFEN 200 MG
400 TABLET ORAL EVERY 8 HOURS PRN
Status: DISCONTINUED | OUTPATIENT
Start: 2024-05-31 | End: 2024-06-01 | Stop reason: HOSPADM

## 2024-05-31 RX ORDER — NALOXONE HYDROCHLORIDE 0.4 MG/ML
0.2 INJECTION, SOLUTION INTRAMUSCULAR; INTRAVENOUS; SUBCUTANEOUS EVERY 5 MIN PRN
Status: DISCONTINUED | OUTPATIENT
Start: 2024-05-31 | End: 2024-06-01 | Stop reason: HOSPADM

## 2024-05-31 RX ORDER — OXYCODONE HYDROCHLORIDE 5 MG/1
10 TABLET ORAL EVERY 4 HOURS PRN
Status: DISCONTINUED | OUTPATIENT
Start: 2024-05-31 | End: 2024-06-01 | Stop reason: HOSPADM

## 2024-05-31 RX ORDER — SUCCINYLCHOLINE CHLORIDE 20 MG/ML
INJECTION INTRAMUSCULAR; INTRAVENOUS AS NEEDED
Status: DISCONTINUED | OUTPATIENT
Start: 2024-05-31 | End: 2024-05-31

## 2024-05-31 RX ORDER — ACETAMINOPHEN 325 MG/1
650 TABLET ORAL EVERY 6 HOURS
Status: DISCONTINUED | OUTPATIENT
Start: 2024-05-31 | End: 2024-06-01 | Stop reason: HOSPADM

## 2024-05-31 RX ORDER — ACETAMINOPHEN 160 MG/5ML
650 SOLUTION ORAL EVERY 6 HOURS
Status: DISCONTINUED | OUTPATIENT
Start: 2024-05-31 | End: 2024-06-01 | Stop reason: HOSPADM

## 2024-05-31 RX ORDER — OXYCODONE HYDROCHLORIDE 5 MG/1
5 TABLET ORAL EVERY 4 HOURS PRN
Status: DISCONTINUED | OUTPATIENT
Start: 2024-05-31 | End: 2024-06-01 | Stop reason: HOSPADM

## 2024-05-31 RX ORDER — ONDANSETRON 4 MG/1
4 TABLET, ORALLY DISINTEGRATING ORAL EVERY 8 HOURS PRN
Status: DISCONTINUED | OUTPATIENT
Start: 2024-05-31 | End: 2024-06-01 | Stop reason: HOSPADM

## 2024-05-31 RX ORDER — ACETAMINOPHEN 650 MG/1
650 SUPPOSITORY RECTAL EVERY 6 HOURS
Status: DISCONTINUED | OUTPATIENT
Start: 2024-05-31 | End: 2024-06-01 | Stop reason: HOSPADM

## 2024-05-31 RX ORDER — METOPROLOL TARTRATE 1 MG/ML
INJECTION, SOLUTION INTRAVENOUS AS NEEDED
Status: DISCONTINUED | OUTPATIENT
Start: 2024-05-31 | End: 2024-05-31

## 2024-05-31 RX ORDER — ENOXAPARIN SODIUM 100 MG/ML
40 INJECTION SUBCUTANEOUS EVERY 24 HOURS
Status: DISCONTINUED | OUTPATIENT
Start: 2024-05-31 | End: 2024-06-01 | Stop reason: HOSPADM

## 2024-05-31 RX ORDER — SODIUM CHLORIDE, SODIUM LACTATE, POTASSIUM CHLORIDE, CALCIUM CHLORIDE 600; 310; 30; 20 MG/100ML; MG/100ML; MG/100ML; MG/100ML
100 INJECTION, SOLUTION INTRAVENOUS CONTINUOUS
Status: DISCONTINUED | OUTPATIENT
Start: 2024-05-31 | End: 2024-05-31 | Stop reason: HOSPADM

## 2024-05-31 RX ORDER — ONDANSETRON HYDROCHLORIDE 2 MG/ML
4 INJECTION, SOLUTION INTRAVENOUS EVERY 8 HOURS PRN
Status: DISCONTINUED | OUTPATIENT
Start: 2024-05-31 | End: 2024-06-01 | Stop reason: HOSPADM

## 2024-05-31 RX ORDER — CEFAZOLIN 1 G/1
INJECTION, POWDER, FOR SOLUTION INTRAVENOUS AS NEEDED
Status: DISCONTINUED | OUTPATIENT
Start: 2024-05-31 | End: 2024-05-31

## 2024-05-31 RX ORDER — SUCRALFATE 1 G/1
1 TABLET ORAL
Status: DISCONTINUED | OUTPATIENT
Start: 2024-05-31 | End: 2024-06-01 | Stop reason: HOSPADM

## 2024-05-31 RX ORDER — PROPOFOL 10 MG/ML
INJECTION, EMULSION INTRAVENOUS AS NEEDED
Status: DISCONTINUED | OUTPATIENT
Start: 2024-05-31 | End: 2024-05-31

## 2024-05-31 RX ORDER — ALBUTEROL SULFATE 0.83 MG/ML
2.5 SOLUTION RESPIRATORY (INHALATION) ONCE AS NEEDED
Status: DISCONTINUED | OUTPATIENT
Start: 2024-05-31 | End: 2024-05-31

## 2024-05-31 RX ORDER — HYDRALAZINE HYDROCHLORIDE 20 MG/ML
10 INJECTION INTRAMUSCULAR; INTRAVENOUS EVERY 30 MIN PRN
Status: DISCONTINUED | OUTPATIENT
Start: 2024-05-31 | End: 2024-05-31 | Stop reason: HOSPADM

## 2024-05-31 RX ORDER — ONDANSETRON HYDROCHLORIDE 2 MG/ML
4 INJECTION, SOLUTION INTRAVENOUS ONCE AS NEEDED
Status: DISCONTINUED | OUTPATIENT
Start: 2024-05-31 | End: 2024-05-31

## 2024-05-31 RX ORDER — LABETALOL HYDROCHLORIDE 5 MG/ML
INJECTION, SOLUTION INTRAVENOUS AS NEEDED
Status: DISCONTINUED | OUTPATIENT
Start: 2024-05-31 | End: 2024-05-31

## 2024-05-31 RX ORDER — LIDOCAINE HYDROCHLORIDE 10 MG/ML
0.1 INJECTION, SOLUTION EPIDURAL; INFILTRATION; INTRACAUDAL; PERINEURAL ONCE
Status: DISCONTINUED | OUTPATIENT
Start: 2024-05-31 | End: 2024-05-31

## 2024-05-31 RX ORDER — SODIUM CHLORIDE, SODIUM LACTATE, POTASSIUM CHLORIDE, CALCIUM CHLORIDE 600; 310; 30; 20 MG/100ML; MG/100ML; MG/100ML; MG/100ML
40 INJECTION, SOLUTION INTRAVENOUS CONTINUOUS
Status: DISCONTINUED | OUTPATIENT
Start: 2024-05-31 | End: 2024-06-01

## 2024-05-31 RX ORDER — PANTOPRAZOLE SODIUM 40 MG/1
40 TABLET, DELAYED RELEASE ORAL 2 TIMES DAILY
Status: DISCONTINUED | OUTPATIENT
Start: 2024-05-31 | End: 2024-05-31

## 2024-05-31 RX ORDER — PANTOPRAZOLE SODIUM 40 MG/1
40 TABLET, DELAYED RELEASE ORAL
Status: DISCONTINUED | OUTPATIENT
Start: 2024-06-01 | End: 2024-06-01 | Stop reason: HOSPADM

## 2024-05-31 RX ORDER — SODIUM CHLORIDE, SODIUM LACTATE, POTASSIUM CHLORIDE, CALCIUM CHLORIDE 600; 310; 30; 20 MG/100ML; MG/100ML; MG/100ML; MG/100ML
INJECTION, SOLUTION INTRAVENOUS CONTINUOUS PRN
Status: DISCONTINUED | OUTPATIENT
Start: 2024-05-31 | End: 2024-05-31

## 2024-05-31 RX ORDER — GABAPENTIN 300 MG/1
300 CAPSULE ORAL 3 TIMES DAILY
Status: DISCONTINUED | OUTPATIENT
Start: 2024-05-31 | End: 2024-06-01 | Stop reason: HOSPADM

## 2024-05-31 RX ORDER — FENTANYL CITRATE 50 UG/ML
INJECTION, SOLUTION INTRAMUSCULAR; INTRAVENOUS AS NEEDED
Status: DISCONTINUED | OUTPATIENT
Start: 2024-05-31 | End: 2024-05-31

## 2024-05-31 RX ORDER — LIDOCAINE HCL/PF 100 MG/5ML
SYRINGE (ML) INTRAVENOUS AS NEEDED
Status: DISCONTINUED | OUTPATIENT
Start: 2024-05-31 | End: 2024-05-31

## 2024-05-31 RX ORDER — VENLAFAXINE 37.5 MG/1
37.5 TABLET ORAL 2 TIMES DAILY
Status: DISCONTINUED | OUTPATIENT
Start: 2024-05-31 | End: 2024-06-01 | Stop reason: HOSPADM

## 2024-05-31 RX ORDER — ONDANSETRON HYDROCHLORIDE 2 MG/ML
INJECTION, SOLUTION INTRAVENOUS AS NEEDED
Status: DISCONTINUED | OUTPATIENT
Start: 2024-05-31 | End: 2024-05-31

## 2024-05-31 RX ORDER — MIDAZOLAM HYDROCHLORIDE 1 MG/ML
INJECTION INTRAMUSCULAR; INTRAVENOUS AS NEEDED
Status: DISCONTINUED | OUTPATIENT
Start: 2024-05-31 | End: 2024-05-31

## 2024-05-31 RX ORDER — HYDROMORPHONE HYDROCHLORIDE 1 MG/ML
0.2 INJECTION, SOLUTION INTRAMUSCULAR; INTRAVENOUS; SUBCUTANEOUS EVERY 5 MIN PRN
Status: DISCONTINUED | OUTPATIENT
Start: 2024-05-31 | End: 2024-05-31

## 2024-05-31 RX ORDER — POLYETHYLENE GLYCOL 3350 17 G/17G
17 POWDER, FOR SOLUTION ORAL DAILY
Status: DISCONTINUED | OUTPATIENT
Start: 2024-05-31 | End: 2024-06-01 | Stop reason: HOSPADM

## 2024-05-31 RX ADMIN — HYDROMORPHONE HYDROCHLORIDE 0.2 MG: 1 INJECTION, SOLUTION INTRAMUSCULAR; INTRAVENOUS; SUBCUTANEOUS at 11:31

## 2024-05-31 RX ADMIN — SODIUM CHLORIDE, POTASSIUM CHLORIDE, SODIUM LACTATE AND CALCIUM CHLORIDE 40 ML/HR: 600; 310; 30; 20 INJECTION, SOLUTION INTRAVENOUS at 15:05

## 2024-05-31 RX ADMIN — SUGAMMADEX 200 MG: 100 INJECTION, SOLUTION INTRAVENOUS at 11:11

## 2024-05-31 RX ADMIN — LIDOCAINE HYDROCHLORIDE 70 MG: 20 INJECTION INTRAVENOUS at 10:24

## 2024-05-31 RX ADMIN — SODIUM CHLORIDE, POTASSIUM CHLORIDE, SODIUM LACTATE AND CALCIUM CHLORIDE: 600; 310; 30; 20 INJECTION, SOLUTION INTRAVENOUS at 10:17

## 2024-05-31 RX ADMIN — FENTANYL CITRATE 50 MCG: 50 INJECTION, SOLUTION INTRAMUSCULAR; INTRAVENOUS at 10:33

## 2024-05-31 RX ADMIN — CALCIUM POLYCARBOPHIL 625 MG: 625 TABLET, FILM COATED ORAL at 21:02

## 2024-05-31 RX ADMIN — ROCURONIUM BROMIDE 30 MG: 10 INJECTION INTRAVENOUS at 10:30

## 2024-05-31 RX ADMIN — ONDANSETRON 4 MG: 2 INJECTION INTRAMUSCULAR; INTRAVENOUS at 11:05

## 2024-05-31 RX ADMIN — PROPOFOL 50 MG: 10 INJECTION, EMULSION INTRAVENOUS at 10:49

## 2024-05-31 RX ADMIN — ACETAMINOPHEN 650 MG: 325 TABLET ORAL at 21:02

## 2024-05-31 RX ADMIN — FENTANYL CITRATE 50 MCG: 50 INJECTION, SOLUTION INTRAMUSCULAR; INTRAVENOUS at 10:24

## 2024-05-31 RX ADMIN — Medication 6 L/MIN: at 11:25

## 2024-05-31 RX ADMIN — VENLAFAXINE 37.5 MG: 37.5 TABLET ORAL at 21:03

## 2024-05-31 RX ADMIN — SUCCINYLCHOLINE CHLORIDE 100 MG: 20 INJECTION, SOLUTION INTRAMUSCULAR; INTRAVENOUS at 10:26

## 2024-05-31 RX ADMIN — SUCRALFATE 1 G: 1 TABLET ORAL at 16:56

## 2024-05-31 RX ADMIN — HYDROMORPHONE HYDROCHLORIDE 0.2 MG: 1 INJECTION, SOLUTION INTRAMUSCULAR; INTRAVENOUS; SUBCUTANEOUS at 12:00

## 2024-05-31 RX ADMIN — HYDROMORPHONE HYDROCHLORIDE 0.2 MG: 1 INJECTION, SOLUTION INTRAMUSCULAR; INTRAVENOUS; SUBCUTANEOUS at 12:41

## 2024-05-31 RX ADMIN — LABETALOL HYDROCHLORIDE 5 MG: 5 INJECTION, SOLUTION INTRAVENOUS at 10:55

## 2024-05-31 RX ADMIN — PROPOFOL 20 MG: 10 INJECTION, EMULSION INTRAVENOUS at 10:36

## 2024-05-31 RX ADMIN — ROCURONIUM BROMIDE 20 MG: 10 INJECTION INTRAVENOUS at 10:57

## 2024-05-31 RX ADMIN — DEXAMETHASONE SODIUM PHOSPHATE 8 MG: 4 INJECTION INTRA-ARTICULAR; INTRALESIONAL; INTRAMUSCULAR; INTRAVENOUS; SOFT TISSUE at 10:37

## 2024-05-31 RX ADMIN — ENOXAPARIN SODIUM 40 MG: 100 INJECTION SUBCUTANEOUS at 15:03

## 2024-05-31 RX ADMIN — PROPOFOL 30 MG: 10 INJECTION, EMULSION INTRAVENOUS at 10:30

## 2024-05-31 RX ADMIN — GABAPENTIN 300 MG: 300 CAPSULE ORAL at 15:04

## 2024-05-31 RX ADMIN — CEFAZOLIN 2 G: 1 INJECTION, POWDER, FOR SOLUTION INTRAMUSCULAR; INTRAVENOUS at 10:31

## 2024-05-31 RX ADMIN — PROPOFOL 80 MG: 10 INJECTION, EMULSION INTRAVENOUS at 10:25

## 2024-05-31 RX ADMIN — HYDRALAZINE HYDROCHLORIDE 10 MG: 20 INJECTION INTRAMUSCULAR; INTRAVENOUS at 12:10

## 2024-05-31 RX ADMIN — PANTOPRAZOLE SODIUM 40 MG: 40 INJECTION, POWDER, FOR SOLUTION INTRAVENOUS at 13:14

## 2024-05-31 RX ADMIN — GABAPENTIN 300 MG: 300 CAPSULE ORAL at 21:02

## 2024-05-31 RX ADMIN — METOPROLOL TARTRATE 2 MG: 5 INJECTION, SOLUTION INTRAVENOUS at 10:47

## 2024-05-31 RX ADMIN — MIDAZOLAM HYDROCHLORIDE 0.5 MG: 1 INJECTION, SOLUTION INTRAMUSCULAR; INTRAVENOUS at 10:17

## 2024-05-31 RX ADMIN — POLYETHYLENE GLYCOL 3350 17 G: 17 POWDER, FOR SOLUTION ORAL at 15:05

## 2024-05-31 RX ADMIN — HYDROMORPHONE HYDROCHLORIDE 0.2 MG: 1 INJECTION, SOLUTION INTRAMUSCULAR; INTRAVENOUS; SUBCUTANEOUS at 11:48

## 2024-05-31 RX ADMIN — ACETAMINOPHEN 650 MG: 325 TABLET ORAL at 15:03

## 2024-05-31 SDOH — SOCIAL STABILITY: SOCIAL INSECURITY: ABUSE: ADULT

## 2024-05-31 SDOH — SOCIAL STABILITY: SOCIAL INSECURITY: HAS ANYONE EVER THREATENED TO HURT YOUR FAMILY OR YOUR PETS?: NO

## 2024-05-31 SDOH — SOCIAL STABILITY: SOCIAL INSECURITY: DO YOU FEEL UNSAFE GOING BACK TO THE PLACE WHERE YOU ARE LIVING?: NO

## 2024-05-31 SDOH — SOCIAL STABILITY: SOCIAL INSECURITY: DOES ANYONE TRY TO KEEP YOU FROM HAVING/CONTACTING OTHER FRIENDS OR DOING THINGS OUTSIDE YOUR HOME?: NO

## 2024-05-31 SDOH — SOCIAL STABILITY: SOCIAL INSECURITY: ARE YOU OR HAVE YOU BEEN THREATENED OR ABUSED PHYSICALLY, EMOTIONALLY, OR SEXUALLY BY ANYONE?: NO

## 2024-05-31 SDOH — SOCIAL STABILITY: SOCIAL INSECURITY: WERE YOU ABLE TO COMPLETE ALL THE BEHAVIORAL HEALTH SCREENINGS?: YES

## 2024-05-31 SDOH — SOCIAL STABILITY: SOCIAL INSECURITY: ARE THERE ANY APPARENT SIGNS OF INJURIES/BEHAVIORS THAT COULD BE RELATED TO ABUSE/NEGLECT?: NO

## 2024-05-31 SDOH — SOCIAL STABILITY: SOCIAL INSECURITY: DO YOU FEEL ANYONE HAS EXPLOITED OR TAKEN ADVANTAGE OF YOU FINANCIALLY OR OF YOUR PERSONAL PROPERTY?: NO

## 2024-05-31 SDOH — SOCIAL STABILITY: SOCIAL INSECURITY: HAVE YOU HAD ANY THOUGHTS OF HARMING ANYONE ELSE?: NO

## 2024-05-31 SDOH — SOCIAL STABILITY: SOCIAL INSECURITY: HAVE YOU HAD THOUGHTS OF HARMING ANYONE ELSE?: NO

## 2024-05-31 ASSESSMENT — PAIN - FUNCTIONAL ASSESSMENT
PAIN_FUNCTIONAL_ASSESSMENT: 0-10

## 2024-05-31 ASSESSMENT — PAIN SCALES - GENERAL
PAINLEVEL_OUTOF10: 8
PAINLEVEL_OUTOF10: 4
PAINLEVEL_OUTOF10: 8
PAINLEVEL_OUTOF10: 0 - NO PAIN
PAINLEVEL_OUTOF10: 3
PAIN_LEVEL: 1
PAINLEVEL_OUTOF10: 8
PAINLEVEL_OUTOF10: 8
PAINLEVEL_OUTOF10: 4
PAINLEVEL_OUTOF10: 7
PAINLEVEL_OUTOF10: 5 - MODERATE PAIN

## 2024-05-31 ASSESSMENT — ACTIVITIES OF DAILY LIVING (ADL)
FEEDING YOURSELF: INDEPENDENT
HEARING - LEFT EAR: FUNCTIONAL
ADEQUATE_TO_COMPLETE_ADL: YES
TOILETING: INDEPENDENT
BATHING: INDEPENDENT
DRESSING YOURSELF: INDEPENDENT
HEARING - RIGHT EAR: FUNCTIONAL
WALKS IN HOME: INDEPENDENT
GROOMING: INDEPENDENT
JUDGMENT_ADEQUATE_SAFELY_COMPLETE_DAILY_ACTIVITIES: YES
LACK_OF_TRANSPORTATION: PATIENT DECLINED
ASSISTIVE_DEVICE: DENTURES PARTIAL;EYEGLASSES
PATIENT'S MEMORY ADEQUATE TO SAFELY COMPLETE DAILY ACTIVITIES?: YES

## 2024-05-31 ASSESSMENT — COLUMBIA-SUICIDE SEVERITY RATING SCALE - C-SSRS
6. HAVE YOU EVER DONE ANYTHING, STARTED TO DO ANYTHING, OR PREPARED TO DO ANYTHING TO END YOUR LIFE?: NO
2. HAVE YOU ACTUALLY HAD ANY THOUGHTS OF KILLING YOURSELF?: NO
1. IN THE PAST MONTH, HAVE YOU WISHED YOU WERE DEAD OR WISHED YOU COULD GO TO SLEEP AND NOT WAKE UP?: NO

## 2024-05-31 ASSESSMENT — LIFESTYLE VARIABLES
HOW OFTEN DO YOU HAVE 6 OR MORE DRINKS ON ONE OCCASION: NEVER
AUDIT-C TOTAL SCORE: 0
HOW MANY STANDARD DRINKS CONTAINING ALCOHOL DO YOU HAVE ON A TYPICAL DAY: PATIENT DOES NOT DRINK
SKIP TO QUESTIONS 9-10: 1
AUDIT-C TOTAL SCORE: 0
HOW OFTEN DO YOU HAVE A DRINK CONTAINING ALCOHOL: NEVER

## 2024-05-31 ASSESSMENT — COGNITIVE AND FUNCTIONAL STATUS - GENERAL
MOBILITY SCORE: 24
DAILY ACTIVITIY SCORE: 24
PATIENT BASELINE BEDBOUND: NO
MOBILITY SCORE: 24

## 2024-05-31 ASSESSMENT — PATIENT HEALTH QUESTIONNAIRE - PHQ9
1. LITTLE INTEREST OR PLEASURE IN DOING THINGS: NOT AT ALL
2. FEELING DOWN, DEPRESSED OR HOPELESS: NOT AT ALL
SUM OF ALL RESPONSES TO PHQ9 QUESTIONS 1 & 2: 0
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
## 2024-05-31 NOTE — CARE PLAN
The patient's goals for the shift include      The clinical goals for the shift include patient will have pain <5 during shift

## 2024-05-31 NOTE — ANESTHESIA POSTPROCEDURE EVALUATION
Patient: Bruce Ziegler    Procedure Summary       Date: 05/31/24 Room / Location: [facility] OR 01 / Virtual [facility] OR    Anesthesia Start: 1017 Anesthesia Stop: 1130    Procedure: Esophagoscopy with Submucosal Myotomy (Esophagus) Diagnosis:       Achalasia of cardia      (Achalasia of cardia [K22.0])    Surgeons: Travon Chandler MD Responsible Provider: Dio Bangura MD    Anesthesia Type: general ASA Status: 3            Anesthesia Type: general    Vitals Value Taken Time   /98 05/31/24 1202   Temp 36 °C (96.8 °F) 05/31/24 1125   Pulse 60 05/31/24 1202   Resp 11 05/31/24 1202   SpO2 99 % 05/31/24 1202   Vitals shown include unfiled device data.    Anesthesia Post Evaluation    Patient location during evaluation: PACU  Patient participation: complete - patient participated  Level of consciousness: awake and alert  Pain score: 1  Pain management: adequate  Multimodal analgesia pain management approach  Airway patency: patent  Cardiovascular status: acceptable and hypertensive  Respiratory status: acceptable  Hydration status: acceptable  Postoperative Nausea and Vomiting: none        There were no known notable events for this encounter.    Postoperative hypertension treated with hydralazine

## 2024-05-31 NOTE — ANESTHESIA PROCEDURE NOTES
Airway  Date/Time: 5/31/2024 10:28 AM  Urgency: elective    Airway not difficult    Staffing  Performed: DEREK   Authorized by: Dio Bangura MD    Performed by: Celena Tolentino  Patient location during procedure: OR    Indications and Patient Condition  Indications for airway management: anesthesia and airway protection  Spontaneous Ventilation: absent  Sedation level: deep  Preoxygenated: yes  Patient position: sniffing  Mask difficulty assessment: 0 - not attempted  Planned trial extubation    Final Airway Details  Final airway type: endotracheal airway      Successful airway: ETT  Cuffed: yes   Successful intubation technique: direct laryngoscopy  Facilitating devices/methods: intubating stylet and anterior pressure/BURP  Endotracheal tube insertion site: oral  Blade: Jose C  Blade size: #3  ETT size (mm): 7.0  Cormack-Lehane Classification: grade I - full view of glottis  Placement verified by: chest auscultation and capnometry   Measured from: gums  ETT to gums (cm): 20  Number of attempts at approach: 1

## 2024-05-31 NOTE — OP NOTE
Esophagoscopy with Submucosal Myotomy Operative Note     Date: 2024  OR Location: [facility] OR    Name: Bruce Ziegler, : 1940, Age: 83 y.o., MRN: [id number], Sex: female    Diagnosis  Pre-op Diagnosis     * Achalasia of cardia [K22.0] Post-op Diagnosis     * Achalasia of cardia [K22.0]     Procedures  Esophagoscopy with Submucosal Myotomy  26906 - [place] TRANSORAL LOWER ESOPHAGEAL MYOTOMY      Surgeons      * Travon Chandler - Primary    Resident/Fellow/Other Assistant:  Surgeons and Role:     * Flakito Saravia MD - Assisting    Procedure Summary  Anesthesia: General  ASA: III  Anesthesia Staff: Anesthesiologist: Dio Bangura MD  CRNA: LEANN Kay-CRNA  SRNA: Celena Tolentino  Estimated Blood Loss: 1mL  Intra-op Medications: Administrations occurring from 1020 to 1220 on 24:  * No intraprocedure medications in log *           Anesthesia Record               Intraprocedure I/O Totals       None           Specimen: No specimens collected     Staff:   Circulator: Nilesh Guptaub Person: Radha  Scrub Person: Frances Luther MD - Fellow       Drains and/or Catheters: * None in log *    Tourniquet Times:         Implants:     Findings: myotomy for 11cm on esophagus and 304 cm on the stomach    Indications: Bruce Ziegler is an 83 y.o. female who is having surgery for Achalasia of cardia [K22.0].     Bruce Ziegler is a 83 y.o. female with history of L breast DCIS, IBS, RA, osteoporosis presenting today for POEM for type 2 achalasia demonstrated on manometry.     The patient was seen in the preoperative area. The risks, benefits, complications, treatment options, non-operative alternatives, expected recovery and outcomes were discussed with the patient. The possibilities of reaction to medication, pulmonary aspiration, injury to surrounding structures, bleeding, recurrent infection, the need for additional procedures, failure to diagnose a condition, and creating a complication requiring  transfusion or operation were discussed with the patient. The patient concurred with the proposed plan, giving informed consent.  The site of surgery was properly noted/marked if necessary per policy. The patient has been actively warmed in preoperative area. Preoperative antibiotics have been ordered and given within 1 hours of incision. Venous thrombosis prophylaxis have been ordered including bilateral sequential compression devices    Procedure Details: After general anesthesia was initiated and the patient was intubated by general anesthesia, the endoscope was advanced through the patient's mouth into the esophagus which was normal in appearence.  Any fluid present was suctioned and we advanced the GE junction, GE junction was tight but was able to be traversed with the endoscope, we then performed retroflexion to ensure no tumors or other obstructing lesions at the GE junction.  We then straightened the scope and introduced the overtube to about handsbreadth from the teeth.  The endoscope was then withdrawn a Barrx was attached to it.  The endoscope was reintroduced into the esophagus through the overtube and we measured the distance from the GE junction to the teeth, this was 42 cm.  Because this patient had type 2 achalasia we withdrew the endoscope 14 cm and eventually would begin our mucusotomy at 28 cm from the teeth.  At this area we injected methylene blue in the submucosal space of the anterior esophagus lifting a submucosal wheal.  Once we injected the whole 10 cc we used a triangle tip knife on cutting current to create our mucusotomy which was approximately 2 cm in length.  We introduced the scope into the submucosal space and started the dissection. We continued the submucosal tunnel keeping the muscle fibers on top of a us and the mucosa beneath us, we intermittently used more methylene blue.  Eventually we dissected to about 3 cm below the GE junction this was confirmed on retroflexion in the  stomach.  We now reentered the tunnel and began our myotomy, we started the myotomy 3 cm distal to our mucusotomy.  We used our endoscopic triangle tip knife to divide the circular muscles using coag current.  Once we cut the muscles all the way down to the GE junction we inspected our myotomy which was wide.  We noted no bleeding in the tunnel.  We went through the LES once more and was now patulous with herniating rugal folds.  We now began the clipping of the mucusotomy, we used 360 resolution clips to close the mucus out of me.  We then reinspected the stomach, desufflated it and withdrew the endoscope and the overtube.    Dr. Luther assisted in this procedure as no qualified resident was available, Dr. Luther assisted in the mucusotomy, dissection myotomy and closure.    Complications:  None; patient tolerated the procedure well.    Disposition: PACU - hemodynamically stable.  Condition: stable         Additional Details: none    Attending Attestation: I was present and scrubbed for the entire procedure.    Travon Chandler  Phone Number: [phone]

## 2024-05-31 NOTE — SIGNIFICANT EVENT
Postop Check    Bruce Ziegler  is a 83 y.o. POD#0 s/p POEM who is recovering well postoperatively. Tolerating clear liquids. Mild throat soreness    VSS  General: no acute distress  Neuro: no focal deficits  CV: regular rate  Resp: nonlabored breathing on 2L NC  Abd: soft, nondistended  Extremities: MAEx4    A/P:  Bruce Ziegler is a 83 y.o. POD#0 s/p POEM    Plan:  - continue current pain regimen  - clear liquid diet  - continue IVF    Flakito Saravia MD  PGY3 General Surgery  [facility] [id number]

## 2024-06-01 VITALS
RESPIRATION RATE: 18 BRPM | BODY MASS INDEX: 18.71 KG/M2 | HEART RATE: 63 BPM | WEIGHT: 105.6 LBS | OXYGEN SATURATION: 95 % | HEIGHT: 63 IN | SYSTOLIC BLOOD PRESSURE: 116 MMHG | TEMPERATURE: 97 F | DIASTOLIC BLOOD PRESSURE: 60 MMHG

## 2024-06-01 PROCEDURE — 2500000001 HC RX 250 WO HCPCS SELF ADMINISTERED DRUGS (ALT 637 FOR MEDICARE OP): Performed by: STUDENT IN AN ORGANIZED HEALTH CARE EDUCATION/TRAINING PROGRAM

## 2024-06-01 RX ADMIN — SUCRALFATE 1 G: 1 TABLET ORAL at 06:10

## 2024-06-01 RX ADMIN — PANTOPRAZOLE SODIUM 40 MG: 40 TABLET, DELAYED RELEASE ORAL at 06:07

## 2024-06-01 RX ADMIN — CALCIUM POLYCARBOPHIL 625 MG: 625 TABLET, FILM COATED ORAL at 09:27

## 2024-06-01 RX ADMIN — ACETAMINOPHEN 650 MG: 325 TABLET ORAL at 09:27

## 2024-06-01 RX ADMIN — GABAPENTIN 300 MG: 300 CAPSULE ORAL at 09:27

## 2024-06-01 ASSESSMENT — PAIN SCALES - GENERAL
PAINLEVEL_OUTOF10: 1
PAINLEVEL_OUTOF10: 0 - NO PAIN

## 2024-06-01 ASSESSMENT — PAIN - FUNCTIONAL ASSESSMENT: PAIN_FUNCTIONAL_ASSESSMENT: 0-10

## 2024-06-01 NOTE — NURSING NOTE
Discussed discharge instructions with pt and spouse both verbalized understanding. Removed peripheral iv without diff pt tolerated well. Pt leaving unit at this time ambulating with family with all belongings gait slow steady.

## 2024-06-01 NOTE — DISCHARGE SUMMARY
Discharge Diagnosis  Achalasia    Issues Requiring Follow-Up  Postoperative visit in 2 weeks.    Test Results Pending At Discharge  Pending Labs       No current pending labs.            Hospital Course   Bruce Ziegler is an 84y/o female with a h/o achalasia who underwent EGD with POEM on 5/31. She was admitted postoperatively for observation. Her postoperatively course was uncomplicated. She tolerated full liquid diet, pain was well controlled, voiding and ambulating appropriately prior to discharge. Instructed to eat a full liquid diet for 1 week and soft diet for 1 week after. Follow up with Dr. Chandler will be arranged for 2 weeks.    Pertinent Physical Exam At Time of Discharge  Physical Exam  Constitutional:       General: She is not in acute distress.  HENT:      Head: Normocephalic and atraumatic.   Eyes:      Extraocular Movements: Extraocular movements intact.   Cardiovascular:      Rate and Rhythm: Normal rate and regular rhythm.   Pulmonary:      Effort: Pulmonary effort is normal. No respiratory distress.   Abdominal:      General: There is no distension.      Palpations: Abdomen is soft.      Tenderness: There is no abdominal tenderness.   Musculoskeletal:         General: Normal range of motion.      Cervical back: Normal range of motion.   Skin:     General: Skin is warm and dry.   Neurological:      General: No focal deficit present.      Mental Status: She is alert.         Home Medications     Medication List      CONTINUE taking these medications     calcium polycarbophiL 625 mg tablet; Commonly known as: Fibercon   celecoxib 100 mg capsule; Commonly known as: CeleBREX   gabapentin 300 mg capsule; Commonly known as: Neurontin   loperamide 2 mg tablet; Commonly known as: Imodium A-D   pantoprazole 40 mg EC tablet; Commonly known as: ProtoNix   sucralfate 1 gram tablet; Commonly known as: Carafate   venlafaxine 37.5 mg tablet; Commonly known as: Effexor       Outpatient Follow-Up  No future  appointments.    Flakito Saravia MD

## 2024-06-01 NOTE — CARE PLAN
The patient's goals for the shift include      The clinical goals for the shift include pt will have pain level <5 throughout shift

## 2024-06-01 NOTE — CARE PLAN
The patient's goals for the shift include      The clinical goals for the shift include patient will have pain <5 during shift      Problem: Pain  Goal: My pain/discomfort is manageable  Outcome: Progressing     Problem: Safety  Goal: Patient will be injury free during hospitalization  Outcome: Progressing  Goal: I will remain free of falls  Outcome: Progressing     Problem: Daily Care  Goal: Daily care needs are met  Outcome: Progressing     Problem: Psychosocial Needs  Goal: Demonstrates ability to cope with hospitalization/illness  Outcome: Progressing  Goal: Collaborate with me, my family, and caregiver to identify my specific goals  Outcome: Progressing     Problem: Discharge Barriers  Goal: My discharge needs are met  Outcome: Progressing

## 2024-06-13 ENCOUNTER — APPOINTMENT (OUTPATIENT)
Dept: SURGERY | Facility: CLINIC | Age: 84
End: 2024-06-13
Payer: MEDICARE

## 2024-06-13 DIAGNOSIS — K22.0 ACHALASIA OF ESOPHAGUS: Primary | ICD-10-CM

## 2024-06-13 PROCEDURE — 99024 POSTOP FOLLOW-UP VISIT: CPT | Performed by: SURGERY

## 2024-06-13 PROCEDURE — 1111F DSCHRG MED/CURRENT MED MERGE: CPT | Performed by: SURGERY

## 2024-06-13 NOTE — PROGRESS NOTES
Subjective   Patient ID: Bruce Ziegler is a 83 y.o. female who presents for evaluation following peroral endoscopic myotomy. A telephone visit (audio only) between the patient and the provider was utilized to provide this telehealth service.  Verbal consent was requested and obtained from the patient on this date, 06/13/24, for a telehealth visit. This communication lasted 15 minutes.  .  Patient is approximately 2 weeks status post peroral endoscopic myotomy for achalasia.  She denies fever, chills, chest pain, shortness of breath, abdominal pain, hematemesis, melena, nor bright red blood per rectum.  She does have heartburn and regurgitation symptoms as well as issues with swallowing.  She has been taking her proton pump inhibitors twice daily.  She has no episodes of spitting up or vomiting.  She is unclear if her symptoms have improved at all.        Review of Systems n/c    Objective   Physical Exam n/a    Assessment/Plan   Patient has continued symptoms following peroral endoscopic myotomy and it is unclear if this is related to postoperative swelling or ineffective esophageal motility or incomplete myotomy.  It also may be related to Gastrosoft reflux disease.  I recommend she continue twice daily proton pump inhibitors and to slowly advance her diet as tolerated.  I have also recommended she space out the time between her last meal and lying recumbent as well as to eat slowly and utilize good chewing of her food.  We will schedule a telehealth visit for 2 weeks in the future.         Travon Chandler MD 06/13/24 2:21 PM

## 2024-06-27 ENCOUNTER — APPOINTMENT (OUTPATIENT)
Dept: SURGERY | Facility: CLINIC | Age: 84
End: 2024-06-27
Payer: MEDICARE

## 2024-06-27 DIAGNOSIS — K22.0 ACHALASIA OF ESOPHAGUS: Primary | ICD-10-CM

## 2024-06-27 PROCEDURE — 1111F DSCHRG MED/CURRENT MED MERGE: CPT | Performed by: SURGERY

## 2024-06-27 PROCEDURE — 99024 POSTOP FOLLOW-UP VISIT: CPT | Performed by: SURGERY

## 2024-06-28 NOTE — PROGRESS NOTES
Subjective   Patient ID: Bruce Ziegler is a 83 y.o. female who presents for follow-up after peroral endoscopic myotomy. A telephone visit (audio only) between the patient and the provider was utilized to provide this telehealth service.  Verbal consent was requested and obtained from the patient on this date, 06/27/24, for a telehealth visit. This communication lasted 15 minutes.  .  Patient is a 83-year-old female approximately 4 weeks status post peroral endoscopic myotomy for achalasia variant and significant dysphagia and regurgitation.  She initially postoperatively had symptoms of GERD and was placed on twice daily proton pump inhibitors.  Presently, she denies fever, chills, hematemesis, melena, bright red blood per rectum, spitting up or vomiting.  Her swallowing is markedly improved and she is tolerating liquids and solids without symptoms of dysphagia or chest pressure as she had previously.  She does feel like she is having some reflux symptoms but they are improved as compared to several weeks ago.  She has no abdominal pain.        Review of Systemsn/c    Objective   Physical Examn/a    Assessment/Plan   Patient is doing better following peroral endoscopic myotomy and I recommended she continue to advance her activity level and diet as tolerated.  She will stay on twice daily proton pump inhibitors and contact the office in 3 to 4 weeks to update us on her clinical status.  If she is doing better, we will consider going to once daily proton pump inhibitors.  In addition, she will undergo upper endoscopy with Bravo capsule placement off medications in 5 months.  Risks were explained including bleeding, infection, perforation, and premature dislodgment of Bravo capsule.  She will otherwise follow-up as needed.         Travon Chandler MD 06/27/24 8:13 PM

## (undated) DEVICE — KNIFE, TRIANGLE TIP, 1650MM  KD-640L

## (undated) DEVICE — NEEDLE, ARTERIAL/VENOUS, BLUNT FILL, 18 G X 1.5 IN

## (undated) DEVICE — GOWN, SURGICAL, SMARTGOWN, LARGE, STERILE

## (undated) DEVICE — APPLICATOR, COTTON TIP, 6 IN, 2PK, STERILE

## (undated) DEVICE — Device

## (undated) DEVICE — REST, HEAD, BAGEL, 9 IN

## (undated) DEVICE — OVERTUBE, GUARDUS, ESOPHAGUS, THERAPEUTIC, 25CM 10.0-11.7MM

## (undated) DEVICE — APPLICATOR, PREP, CHLORAPREP, W/ORANGE TINT, 10.5ML

## (undated) DEVICE — SYRINGE, LUER LOCK, 12ML

## (undated) DEVICE — CLIP, RESOLUTION 360, 235 CM (10BX)

## (undated) DEVICE — MARKER, SKIN, RULER AND LABEL PACK, CUSTOM

## (undated) DEVICE — BOWL, BASIN, 32 OZ, STERILE

## (undated) DEVICE — NEEDLE, INJECTION, ARTICULAR, 25 G X 5 MM - COLONOSCOPE

## (undated) DEVICE — CATHETER, IV, INSYTE, AUTOGUARD, SHIELDED, 14 G X 1.75 IN, VIALON

## (undated) DEVICE — COVER, TABLE, 44 X 75 IN, DISPOSABLE, LF, STERILE

## (undated) DEVICE — TUBING, SUCTION, CONNECTING, STERILE 0.25 X 120 IN., LF

## (undated) DEVICE — HALO CAP, SMALL

## (undated) DEVICE — PRE-CLEAN KIT, BEDSIDE, FIRST STEP

## (undated) DEVICE — NEEDLE, BLUNT, 15 G X 1.5 IN

## (undated) DEVICE — DRESSING, GAUZE, 16 PLY, 4 X 4 IN, STERILE

## (undated) DEVICE — VALVE, DEFENDO, 5 PCS BUTTON SET, SINGLE USE

## (undated) DEVICE — SYRINGE, 60 CC, LUER LOCK, MONOJECT, W/O CAP, LF

## (undated) DEVICE — COVER, CART, 45 X 27 X 48 IN, CLEAR

## (undated) DEVICE — CATHETER, ERCP, GLO TIP, STANDARD TIP, 5.5 FR X 200 CM, PURPLE, STERILE